# Patient Record
Sex: FEMALE | Race: WHITE | Employment: OTHER | URBAN - METROPOLITAN AREA
[De-identification: names, ages, dates, MRNs, and addresses within clinical notes are randomized per-mention and may not be internally consistent; named-entity substitution may affect disease eponyms.]

---

## 2020-02-12 ENCOUNTER — HOSPITAL ENCOUNTER (OUTPATIENT)
Dept: PHYSICAL THERAPY | Age: 70
Discharge: HOME | End: 2020-02-12
Attending: FAMILY MEDICINE
Payer: MEDICARE

## 2020-02-12 DIAGNOSIS — M79.605 BILATERAL LEG PAIN: ICD-10-CM

## 2020-02-12 DIAGNOSIS — M79.604 BILATERAL LEG PAIN: ICD-10-CM

## 2020-02-12 PROCEDURE — 97162 PT EVAL MOD COMPLEX 30 MIN: CPT

## 2020-02-12 PROCEDURE — 97110 THERAPEUTIC EXERCISES: CPT

## 2020-02-12 NOTE — PROGRESS NOTES
Kim Cherry  : 1950  Payor: Cheryl Feng / Plan: Al Pollock MEDICARE COMPLETE / Product Type: Managed Care Medicare /  [address] [place] [address]  at [place] [address] Therapy  [address], [address]  [phone]   [phone]      OUTPATIENT PHYSICAL THERAPY: Daily Treatment Note 2020  Visit Count:  1    ICD-10: Treatment Diagnosis: pain in right limb M79.604, pain in left limb M79.606, difficulty walking R 26.2    Pre-treatment Symptoms/Complaints:  Pt presenting with increased left leg pain today. Pain: Initial: Pain Intensity 1: 7  Post Session:  7/10   Medications Last Reviewed:  2020  Updated Objective Findings:  See evaluation note from today   TREATMENT:   THERAPEUTIC EXERCISE: (15 minutes):  Exercises per grid below to improve mobility and strength. Required minimal verbal cues to promote proper body mechanics. Progressed resistance, range and repetitions as indicated. LTR x 5  Double knee to chest x 3 hold 10 sec  Bridging x 5  Hamstring stretching 2x hold 20 sec  Manual Therapy (  na    ): na    Therapeutic Modalities ( na    ):na    Evaluation (x)    HEP: As above; handouts given to patient for all exercises. Treatment/Session Summary:    · Response to Treatment:  Pt seen for eval today. She has increased pain in the left leg today and has had difficulty with walking. She will benefit from lower back exercises as it appears she is dealing with sciatica like symptoms and possible disc problems. · Communication/Consultation:  None today  · Equipment provided today:  None today  · Recommendations/Intent for next treatment session: Next visit will focus on strengthening and stretching and modalities as needed.   Treatment Plan of Care Effective Dates:  2020 to 2020  Total Treatment Billable Duration:  eval plus 15 min  PT Patient Time In/Time Out  Time In: 0100  Time Out: Beata May 60, PT    Future Appointments   Date Time Provider Bala Allen   2/17/2020  9:00 AM Sadia End, PT [facility]   2/19/2020 11:30 AM Sadia End, PT [facility]

## 2020-02-12 NOTE — THERAPY EVALUATION
Shane Class Dilip  : 1950  Payor: Tam Simmons / Plan: Nirajjose Schroeder MEDICARE COMPLETE / Product Type: Managed Care Medicare /  [facility] [place]  at [facility]  [address], [address]  [phone]   [phone]         OUTPATIENT PHYSICAL THERAPY:Initial Assessment 2020   ICD-10: Treatment Diagnosis: pain in right limb M79.604, pain in left limb M79.606, difficulty walking R 26.2  Precautions/Allergies:   Patient has no known allergies. TREATMENT PLAN:  Effective Dates: 2020 TO 2020 (90 days). Frequency/Duration: 2 times a week for 90 Day(s) and upon reassessment, will adjust frequency and duration as progress indicates. MEDICAL/REFERRING DIAGNOSIS:  Bilateral leg pain [M79.604, M79.605]   DATE OF ONSET: 2020  REFERRING PHYSICIAN: Cecil Stockton MD MD Orders: Daryle Hare and treat  Return MD Appointment: as needed     INITIAL ASSESSMENT:  Ms. Jessica Molina presents with increased bilateral leg pain with left worse than right today. She reported picking up a box and turning and she began to feel increased right hip/buttocks pain followed by right leg pain. She reported this has improved, but now she has increased pain in the left leg. She attempted yoga today and thinks this made her left leg worse. She did have some treatments by her chiropractor and he also took x-rays. Per his assessment, pt has stenosis. Pt has not seen chiropractor recently and was referred to therapy to address her pain. She was very active prior to her injury, exercising 5 days a week. She enjoys pilates, yoga, cardio and weight training. PROBLEM LIST (Impacting functional limitations):  1. Decreased Strength  2. Decreased ADL/Functional Activities  3. Decreased Ambulation Ability/Technique  4. Increased Pain  5. Decreased Activity Tolerance  6.  Decreased Flexibility/Joint Mobility INTERVENTIONS PLANNED: (Treatment may consist of any combination of the following)  1. Electrical Stimulation  2. Heat  3. Home Exercise Program (HEP)  4. Manual Therapy  5. Range of Motion (ROM)  6. Therapeutic Exercise/Strengthening  7. lumbar mechanical traction     GOALS: (Goals have been discussed and agreed upon with patient.)  Short-Term Functional Goals: Time Frame: 4 weeks  1. Establish independent HEP with no cuing. 2. Pt will be able to report ambulating up to 20 minutes without increased pain. 3. Pt will be able to report ability to return to yoga for 20-30 minutes without increased pain. Discharge Goals: Time Frame: 12 weeks (90 days)  1. Pt will be able to improve score on LEFS by at least 9 points for advanced ADL's.  2. Pt will be able to report ability to bend forward and lift objects without increased pain. 3. Pt will be able to increase left LE strength to 4+/5 to 5/5.  4. Pt will be able to return to prior activity level. OUTCOME MEASURE:   Tool Used: Lower Extremity Functional Scale (LEFS)  Score:  Initial: 35/80 Most Recent: X/80 (Date: -- )   Interpretation of Score: 20 questions each scored on a 5 point scale with 0 representing \"extreme difficulty or unable to perform\" and 4 representing \"no difficulty\". The lower the score, the greater the functional disability. 80/80 represents no disability. Minimal detectable change is 9 points. MEDICAL NECESSITY:   · Patient is expected to demonstrate progress in strength to improve her overall mobility and ability to ambulate normally without increased back of leg pain. REASON FOR SERVICES/OTHER COMMENTS:  · Patient continues to require skilled intervention due to antalgic gait pattern, pain and inability to stand or walk for prolonged periods of time.   Total Duration:  PT Patient Time In/Time Out  Time In: 0100  Time Out: 0200    Rehabilitation Potential For Stated Goals: Good  Regarding Bull Rodriguez Dilip's therapy, I certify that the treatment plan above will be carried out by a therapist or under their direction. Thank you for this referral,  Shawna Aviles, PT     Referring Physician Signature: Lauren Eid MD _______________________________ Date _____________                        PAIN/SUBJECTIVE:   Initial: Pain Intensity 1: 7  Post Session:  7/10   HISTORY:   History of Injury/Illness (Reason for Referral):  Ms. Yanna Shannon presents with increased bilateral leg pain with left worse than right today. She reported picking up a box and turning and she began to feel increased right hip/buttocks pain followed by right leg pain. She reported this has improved, but now she has increased pain in the left leg. She attempted yoga today and thinks this made her left leg worse. She did have some treatments by her chiropractor and he also took x-rays. Per his assessment, pt has stenosis. Pt has not seen chiropractor recently and was referred to therapy to address her pain. She was very active prior to her injury, exercising 5 days a week. She enjoys pilates, yoga, cardio and weight training. Past Medical History/Comorbidities:   Ms. Yanna Shannon  has a past medical history of Anxiety. Ms. Yanna Shannon  has a past surgical history that includes hx heent (1978) and hx orthopaedic (2005). Social History/Living Environment:     pt lives with spouse  Prior Level of Function/Work/Activity:  Pt is retired. Very active. Works out 5 days per week     Ambulatory/Rehab Services H2 Model Falls Risk Assessment   Risk Factors:       No Risk Factors Identified Ability to Rise from Chair:       (0)  Ability to rise in a single movement   Falls Prevention Plan:       No modifications necessary   Total: (5 or greater = High Risk): 0   ©2010 [facility]. All Rights Reserved. [facility] States Patent #[id number].  Federal Law prohibits the replication, distribution or use without written permission from [employer]   Current Medications:       Current Outpatient Medications:     glucosamine sulfate 500 mg capsule, Take 1,000 mg by mouth three (3) times daily. , Disp: , Rfl:     meloxicam (MOBIC) 7.5 mg tablet, Take 1-2 Tabs by mouth daily. , Disp: 30 Tab, Rfl: 1    FLUoxetine (PROZAC) 20 mg capsule, Take 1-2 Caps by mouth daily. , Disp: 180 Cap, Rfl: 3    Magnesium Oxide 500 mg cap, Take  by mouth., Disp: , Rfl:     cyanocobalamin 1,000 mcg tablet, Take 1,000 mcg by mouth daily. , Disp: , Rfl:     multivitamin (ONE A DAY) tablet, Take 1 Tab by mouth daily. , Disp: , Rfl:    Date Last Reviewed:  2/12/2020   Number of Personal Factors/Comorbidities that affect the Plan of Care: 1-2: MODERATE COMPLEXITY        EXAMINATION:     ROM:     LROM WFL-pt did feel some pain with rotation  Extension is definitely painful for the patient-  Mild bilateral hip ER tightness-all other hip motions WFL                              Strength:     R hip flex 5/5, quad 4+/5, HS 5/5, glut  Med 4+/5, ankles 5/5         L hip flex 4/5, quad 4/5, HS 4/5, glut med 4/5, ankles 5/5            Special Tests: negative SLR  Neurological Screen: pt reporting numbness and tingling in bilateral toes  Balance:  good      Body Structures Involved:  1. Nerves  2. Bones  3. Joints  4. Muscles  5. Ligaments Body Functions Affected:  1. Sensory/Pain  2. Neuromusculoskeletal  3. Movement Related Activities and Participation Affected:  1. General Tasks and Demands  2. Mobility  3. Domestic Life  4. Interpersonal Interactions and Relationships  5.  Community, Social and [place]   Number of elements (examined above) that affect the Plan of Care: 4+: HIGH COMPLEXITY   CLINICAL PRESENTATION:   Presentation: Evolving clinical presentation with changing clinical characteristics: MODERATE COMPLEXITY   CLINICAL DECISION MAKING:   Use of outcome tool(s) and clinical judgement create a POC that gives a: Questionable prediction of patient's progress: MODERATE COMPLEXITY

## 2020-02-17 ENCOUNTER — HOSPITAL ENCOUNTER (OUTPATIENT)
Dept: PHYSICAL THERAPY | Age: 70
Discharge: HOME | End: 2020-02-17
Attending: FAMILY MEDICINE
Payer: MEDICARE

## 2020-02-17 PROCEDURE — 97012 MECHANICAL TRACTION THERAPY: CPT

## 2020-02-17 PROCEDURE — 97110 THERAPEUTIC EXERCISES: CPT

## 2020-02-17 PROCEDURE — 97140 MANUAL THERAPY 1/> REGIONS: CPT

## 2020-02-17 NOTE — PROGRESS NOTES
Vazquez Cherry  : 1950  Payor: Carson Asher / Plan: Sandi Thompson / Product Type: Managed Care Medicare /  [address] [place]  at [facility] Therapy  [address], [address]  [phone]   [phone]      OUTPATIENT PHYSICAL THERAPY: Daily Treatment Note 2020  Visit Count:  2    ICD-10: Treatment Diagnosis: pain in right limb M79.604, pain in left limb M79.606, difficulty walking R 26.2    Pre-treatment Symptoms/Complaints:  Pt presenting with continued increased left leg pain in the mid hamstring region. The pain radiates down the leg and she does report some numbness at times. Pain: Initial: Pain Intensity 1: 7  Post Session:  7/10   Medications Last Reviewed:  2020  Updated Objective Findings:  None Today   TREATMENT:   THERAPEUTIC EXERCISE: (15 minutes):  Exercises per grid below to improve mobility and strength. Required minimal verbal cues to promote proper body mechanics. Progressed resistance, range and repetitions as indicated. Review of HEP  Neural gliding in anterior pelvic tilt x 12  Neural gliding in posterior pelvic tilt x 12  Manual Therapy (  15 min   ): STM to left posterior hamstring and left buttocks to release tension    Therapeutic Modalities ( 15 min   ): traction x 15 min at 70# for reduction in radicular symptoms    HEP: continue as directed    Treatment/Session Summary:    · Response to Treatment:  Pt reporting continued increased left leg pain in the posterior thigh along mid hamstrings. She felt less pain with posterior pelvic tilt and neural gliding. She did also report a reduction in her leg pain after traction, but it was not fully resolved. .  · Communication/Consultation:  None today  · Equipment provided today:  None today  · Recommendations/Intent for next treatment session: Next visit will focus on strengthening and stretching and modalities as needed.   Treatment Plan of Care Effective Dates:  2/12/2020 to 5/12/2020  Total Treatment Billable Duration:  45  PT Patient Time In/Time Out  Time In: 0900  Time Out: 311 [facility], PT    Future Appointments   Date Time Provider Bala Allen   2/19/2020 11:30 AM Fide Martinez PT [facility]

## 2020-02-19 ENCOUNTER — HOSPITAL ENCOUNTER (OUTPATIENT)
Dept: PHYSICAL THERAPY | Age: 70
Discharge: HOME | End: 2020-02-19
Attending: FAMILY MEDICINE
Payer: MEDICARE

## 2020-02-19 PROCEDURE — 97012 MECHANICAL TRACTION THERAPY: CPT

## 2020-02-19 NOTE — PROGRESS NOTES
Jah Cherry  : 1950  Payor: Yonathan Civil / Plan: [employer] / Product Type: Managed Care Medicare /  [address]  at [facility] Therapy  [address], [address]  [phone]   [phone]      OUTPATIENT PHYSICAL THERAPY: Daily Treatment Note 2020  Visit Count:  3    ICD-10: Treatment Diagnosis: pain in right limb M79.604, pain in left limb M79.606, difficulty walking R 26.2    Pre-treatment Symptoms/Complaints:  Pt presenting with continued increased left leg pain in the mid hamstring region. The pain radiates down the leg and she does report some numbness at times. She does  Pain: Initial: Pain Intensity 1: 7  Post Session:  6/10   Medications Last Reviewed:  2020  Updated Objective Findings:  None Today   TREATMENT:   THERAPEUTIC EXERCISE: (0 minutes):  Exercises per grid below to improve mobility and strength. Required minimal verbal cues to promote proper body mechanics. Progressed resistance, range and repetitions as indicated. Review of HEP x 5 min  Neural gliding in anterior pelvic tilt x 12  Neural gliding in posterior pelvic tilt x 12  Manual Therapy (  0 min   ): na    Therapeutic Modalities ( 25 min   ): traction x 25 min at 70# for reduction in radicular symptoms    HEP: continue as directed    Treatment/Session Summary:    · Response to Treatment:  Pt reporting she has had ongoing left leg pain. She did feel relief while in the traction unit. She also reported some mild relief post last treatment. She is using over the counter lidocane patches now as needed. Will continue to work on reducing her leg pain with standing by use of traction and possible exercises. .  · Communication/Consultation:  None today  · Equipment provided today:  None today  · Recommendations/Intent for next treatment session: Next visit will focus on strengthening and stretching and modalities as needed.   Treatment Plan of Care Effective Dates:  2/12/2020 to 5/12/2020  Total Treatment Billable Duration:  25 min  PT Patient Time In/Time Out  Time In: 1130  Time Out: [address], PT    Future Appointments   Date Time Provider Bala Allen   2/25/2020  9:45 AM Sadia End, PT [facility]   2/28/2020  9:45 AM Sadia End, PT [facility]

## 2020-02-25 ENCOUNTER — HOSPITAL ENCOUNTER (OUTPATIENT)
Dept: PHYSICAL THERAPY | Age: 70
Discharge: HOME | End: 2020-02-25
Attending: FAMILY MEDICINE
Payer: MEDICARE

## 2020-02-25 PROCEDURE — 97110 THERAPEUTIC EXERCISES: CPT

## 2020-02-25 PROCEDURE — 97140 MANUAL THERAPY 1/> REGIONS: CPT

## 2020-02-25 PROCEDURE — 97012 MECHANICAL TRACTION THERAPY: CPT

## 2020-02-25 NOTE — PROGRESS NOTES
Candice Cherry  : 1950  Payor: Teja Jacobs / Plan: [address] / Product Type: Managed Care Medicare /  [address] [place]  at [facility] Therapy  [address], [address]  [phone]   [phone]      OUTPATIENT PHYSICAL THERAPY: Daily Treatment Note 2020  Visit Count:  4    ICD-10: Treatment Diagnosis: pain in right limb M79.604, pain in left limb M79.606, difficulty walking R 26.2    Pre-treatment Symptoms/Complaints:  Pt presenting with continued pain in the left leg along buttocks and hamstrings. She reports spasming there. She will be seeing MD at [address] [place] [address] today. Pain: Initial: Pain Intensity 1: 7  Post Session:  6/10   Medications Last Reviewed:  2020  Updated Objective Findings:  None Today   TREATMENT:   THERAPEUTIC EXERCISE: (19 minutes):  Exercises per grid below to improve mobility and strength. Required minimal verbal cues to promote proper body mechanics. Progressed resistance, range and repetitions as indicated. Nu-step level 2 x 10 min  LTR x 20  Pelvic tilt x 20    Manual Therapy (  10 min   ): STM to the region of the left buttocks, greater trochanter and hamstring to help reduce spasm and pain    Therapeutic Modalities ( 20 min   ): traction x 25 min at 70# for reduction in radicular symptoms  That pt is having down her left leg  HEP: continue as directed    Treatment/Session Summary:    · Response to Treatment:  Pt reports she had spams down the left buttocks and down the left leg. She did perform several light exercises today and had mild relief. On traction, pt was reporting a reduction in her radicular symptoms. She will be seeing MD today at [place]. She also had some palpable edema around the left greater trochanter region.   · Communication/Consultation:  None today  · Equipment provided today:  None today  · Recommendations/Intent for next treatment session: Next visit will focus on strengthening and stretching and modalities as needed.   Treatment Plan of Care Effective Dates:  2/12/2020 to 5/12/2020  Total Treatment Billable Duration:  49 min  PT Patient Time In/Time Out  Time In: 0945  Time Out: Jennifer Nava 55, PT    Future Appointments   Date Time Provider Bala Allen   2/28/2020  9:45 AM Sara Crisostomo, PT [facility]

## 2020-02-28 ENCOUNTER — HOSPITAL ENCOUNTER (OUTPATIENT)
Dept: PHYSICAL THERAPY | Age: 70
Discharge: HOME | End: 2020-02-28
Attending: FAMILY MEDICINE
Payer: MEDICARE

## 2020-02-28 PROCEDURE — 97110 THERAPEUTIC EXERCISES: CPT

## 2020-02-28 PROCEDURE — 97012 MECHANICAL TRACTION THERAPY: CPT

## 2020-02-28 NOTE — PROGRESS NOTES
Latia Cherry  : 1950  Payor: Lisa Miranda / Plan: Carlos Lancaster MEDICARE COMPLETE / Product Type: Managed Care Medicare /  [facility] [place]  at [place] [address] Therapy  [address], [address]  [phone]   [phone]      OUTPATIENT PHYSICAL THERAPY: Daily Treatment Note 2020  Visit Count:  5    ICD-10: Treatment Diagnosis: pain in right limb M79.604, pain in left limb M79.606, difficulty walking R 26.2    Pre-treatment Symptoms/Complaints:  Pt presenting she saw PA at [facility]. She has been diagnosed with DDD, spondylolisthesis, and scoliosis. Pain: Initial: Pain Intensity 1: 7  Post Session:  6/10   Medications Last Reviewed:  2020  Updated Objective Findings:  None Today   TREATMENT:   THERAPEUTIC EXERCISE: (38 minutes):  Exercises per grid below to improve mobility and strength. Required minimal verbal cues to promote proper body mechanics. Progressed resistance, range and repetitions as indicated. Nu-step level 2 x 10 min  LTR with ball x 20  Pelvic tilt x 20  abd bracing x 20  Bilateral hip extension 37.5# x 30  Attempted prone on elbows, but increased radicular symptoms  Manual Therapy (  0min   ): na  Therapeutic Modalities ( 20 min   ): traction x 20 min at 70# for reduction in radicular symptoms  That pt is having down her left leg  HEP: continue as directed    Treatment/Session Summary:    · Response to Treatment:  Pt reports feeling ongoing leg pain in the leg. We progressed to several lower back core exercises today, but very minimal due to pain. She is also receiving traction to help with her radicular symptoms. She will attempt to ride recumbent bike at gym to help with her cardio. .  · Communication/Consultation:  None today  · Equipment provided today:  None today  · Recommendations/Intent for next treatment session: Next visit will focus on strengthening and stretching and modalities as needed.   Treatment Plan of Care Effective Dates:  2/12/2020 to 5/12/2020  Total Treatment Billable Duration:  58 min  PT Patient Time In/Time Out  Time In: 0945  Time Out: Concepcion Mcdermott 19, PT    No future appointments.

## 2020-03-02 ENCOUNTER — HOSPITAL ENCOUNTER (OUTPATIENT)
Dept: PHYSICAL THERAPY | Age: 70
Discharge: HOME | End: 2020-03-02
Attending: FAMILY MEDICINE
Payer: MEDICARE

## 2020-03-02 PROCEDURE — 97110 THERAPEUTIC EXERCISES: CPT

## 2020-03-02 PROCEDURE — 97012 MECHANICAL TRACTION THERAPY: CPT

## 2020-03-02 NOTE — PROGRESS NOTES
Percell Class Domarily  : 1950  Payor: Tam Simmons / Plan: Mirlande Shannon / Product Type: Managed Care Medicare /  [facility] [place] [facility] [place]  at [facility] Therapy  [address], [address]  [phone]   [phone]      OUTPATIENT PHYSICAL THERAPY: Daily Treatment Note 3/2/2020  Visit Count:  6    ICD-10: Treatment Diagnosis: pain in right limb M79.604, pain in left limb M79.606, difficulty walking R 26.2    Pre-treatment Symptoms/Complaints:  Pt presenting she felt great after her last treatment, but then she went grocery shopping and leg pain returned. Pain: Initial: Pain Intensity 1: 7  Post Session:  6/10   Medications Last Reviewed:  3/2/2020  Updated Objective Findings:  None Today   TREATMENT:   THERAPEUTIC EXERCISE: (38 minutes):  Exercises per grid below to improve mobility and strength. Required minimal verbal cues to promote proper body mechanics. Progressed resistance, range and repetitions as indicated. Nu-step level 2 x 10 min  LTR with ball x 20  Pelvic tilt x 20  abd bracing x 20  abd bracing with bent knee fall outs x 20  Bilateral HS stretching 3x hold 20 sec  Left supine piriformis stretching 3x hold 25 sec  Bilateral hip extension 37.5# x 30    Manual Therapy (  0min   ): na  Therapeutic Modalities ( 20 min   ): traction x 20 min at 70# for reduction in radicular symptoms  That pt is having down her left leg  HEP: continue as directed    Treatment/Session Summary:    · Response to Treatment:  Pt reporting feeling better post treatment. She reports the therapy helps to temporrary relieve her leg pain. She is having radicular symptoms from the DDD in her lower spine. She is progressing well, but is very used to an active lifestyle and is having to learn to cope with lighter activities at this time until her symptoms improve. .  · Communication/Consultation:  None today  · Equipment provided today:  None today  · Recommendations/Intent for next treatment session: Next visit will focus on strengthening and stretching and modalities as needed.   Treatment Plan of Care Effective Dates:  2/12/2020 to 5/12/2020  Total Treatment Billable Duration:  58 min  PT Patient Time In/Time Out  Time In: 0900  Time Out: [address], PT    Future Appointments   Date Time Provider Bala Allen   3/4/2020  9:45 AM Mirlande Hinton, PT [facility]

## 2020-03-04 ENCOUNTER — HOSPITAL ENCOUNTER (OUTPATIENT)
Dept: PHYSICAL THERAPY | Age: 70
Discharge: HOME | End: 2020-03-04
Attending: FAMILY MEDICINE
Payer: MEDICARE

## 2020-03-04 PROCEDURE — 97110 THERAPEUTIC EXERCISES: CPT

## 2020-03-04 PROCEDURE — 97012 MECHANICAL TRACTION THERAPY: CPT

## 2020-03-04 NOTE — PROGRESS NOTES
Cheryl Cherry  : 1950  Payor: Julius Rossi / Plan: Remi Portillo MEDICARE COMPLETE / Product Type: Managed Care Medicare /  [address] [place]  at [facility] Therapy  [address] [place] [address], [address]  [phone]   [phone]      OUTPATIENT PHYSICAL THERAPY: Daily Treatment Note 3/4/2020  Visit Count:  7    ICD-10: Treatment Diagnosis: pain in right limb M79.604, pain in left limb M79.606, difficulty walking R 26.2    Pre-treatment Symptoms/Complaints:  Pt presenting she continues to have increase left hamstring pain/leg pain. Pain: Initial: Pain Intensity 1: 7  Post Session:  6/10   Medications Last Reviewed:  3/4/2020  Updated Objective Findings:  None Today   TREATMENT:   THERAPEUTIC EXERCISE: (38 minutes):  Exercises per grid below to improve mobility and strength. Required minimal verbal cues to promote proper body mechanics. Progressed resistance, range and repetitions as indicated. Nu-step level 2 x 10 min  LTR with ball x 20  Pelvic tilt x 20  abd bracing x 20  abd bracing with bent knee fall outs x 20  Bilateral HS stretching 3x hold 20 sec  Left supine piriformis stretching 3x hold 25 sec  Bilateral hip extension 37.5# x 30  Bird dog x 15  HS curls 20# double leg x 20  Manual Therapy (  0min   ): na  Therapeutic Modalities ( 20 min   ): traction x 20 min at 78 # for reduction in radicular symptoms  That pt is having down her left leg  HEP: continue as directed    Treatment/Session Summary:    · Response to Treatment:  Pt continues to have radicular symptoms down the leg. She is using massager on the leg at home and was instructed in how many times a day to do this. Three to four times at 10 minutes at a time. She has a tendency to overdo and this could be contributing to her ongoing symptoms. She progressed well with exercises today in clinic. Traction seems to relieve her leg pain. .  · Communication/Consultation:  None today  · Equipment provided today:  None today  · Recommendations/Intent for next treatment session: Next visit will focus on strengthening and stretching and modalities as needed. Treatment Plan of Care Effective Dates:  2/12/2020 to 5/12/2020  Total Treatment Billable Duration:  58 min  PT Patient Time In/Time Out  Time In: 0945  Time Out: Concepcion Mcdermott 19, PT    No future appointments.

## 2020-03-11 ENCOUNTER — HOSPITAL ENCOUNTER (OUTPATIENT)
Dept: PHYSICAL THERAPY | Age: 70
Discharge: HOME | End: 2020-03-11
Attending: FAMILY MEDICINE
Payer: MEDICARE

## 2020-03-11 PROCEDURE — 97012 MECHANICAL TRACTION THERAPY: CPT

## 2020-03-11 PROCEDURE — 97110 THERAPEUTIC EXERCISES: CPT

## 2020-03-11 NOTE — PROGRESS NOTES
Paul Cherry  : 1950  Payor: [address] [place] [address] / Plan: Diana Patel MEDICARE COMPLETE / Product Type: Managed Care Medicare /  [facility] [place]  at [facility] Therapy  [address] [place] [address], [address]  [phone]   [phone]      OUTPATIENT PHYSICAL THERAPY: Daily Treatment Note 3/11/2020  Visit Count:  8    ICD-10: Treatment Diagnosis: pain in right limb M79.604, pain in left limb M79.606, difficulty walking R 26.2    Pre-treatment Symptoms/Complaints:  Pt reporting continued pain in the left leg. Foot is now going numb. She has CT scan for 3/17/2020. Pain: Initial: Pain Intensity 1: 7  Post Session:  6/10   Medications Last Reviewed:  3/11/2020  Updated Objective Findings:  None Today   TREATMENT:   THERAPEUTIC EXERCISE: (38 minutes):  Exercises per grid below to improve mobility and strength. Required minimal verbal cues to promote proper body mechanics. Progressed resistance, range and repetitions as indicated. Nu-step level 2 x 10 min  LTR with ball x 20  Pelvic tilt x 20  abd bracing x 20  abd bracing with bent knee fall outs x 20  Bilateral HS stretching 3x hold 20 sec  Left supine piriformis stretching 3x hold 25 sec  Bilateral hip extension 37.5# x 30-held  Bird dog x 15-held  HS curls 20# double leg x 20-held  Manual Therapy (  0min   ): na  Therapeutic Modalities ( 25 min   ): traction x 25 min at 78 # for reduction in radicular symptoms  That pt is having down her left leg  HEP: continue as directed    Treatment/Session Summary:    · Response to Treatment:  Pt reports feeling continued pain and spasms in her left leg in hamstring region and now foot is going numb. Attempted hamstring stretching, but this made her worse. We performed as many exercises as patient could tolerate and performed traction. this does help to alleviate some of her leg pain. .  · Communication/Consultation:  None today  · Equipment provided today:  None today  · Recommendations/Intent for next treatment session: Next visit will focus on strengthening and stretching and modalities as needed.   Treatment Plan of Care Effective Dates:  2/12/2020 to 5/12/2020  Total Treatment Billable Duration:  63 min  PT Patient Time In/Time Out  Time In: 0115  Time Out: [address], PT    Future Appointments   Date Time Provider Bala Allen   3/13/2020 12:45 PM Sadia End, PT [facility]   3/16/2020 12:15 PM Sadia End, PT [facility]   3/17/2020 10:00 AM [facility] IR UNIT 2 [facility]   3/17/2020 16:27 AM SFD CT 64 SLICE UNIT 1 [facility]   3/19/2020  1:00 PM Sadia End, PT [facility]   3/24/2020 12:15 PM Sadia End, PT [facility]   3/26/2020  1:00 PM Sadia End, PT [facility]

## 2020-03-13 ENCOUNTER — APPOINTMENT (OUTPATIENT)
Dept: PHYSICAL THERAPY | Age: 70
End: 2020-03-13
Attending: FAMILY MEDICINE
Payer: MEDICARE

## 2020-03-16 ENCOUNTER — HOSPITAL ENCOUNTER (OUTPATIENT)
Dept: PHYSICAL THERAPY | Age: 70
Discharge: HOME | End: 2020-03-16
Attending: FAMILY MEDICINE
Payer: MEDICARE

## 2020-03-16 PROCEDURE — 97110 THERAPEUTIC EXERCISES: CPT

## 2020-03-16 PROCEDURE — 97012 MECHANICAL TRACTION THERAPY: CPT

## 2020-03-16 NOTE — PROGRESS NOTES
Percell Class Doohen  : 1950  Payor: Tam Simmons / Plan: Anca Malonemery MEDICARE COMPLETE / Product Type: Managed Care Medicare /  [address]  at [facility] Therapy  [address], [address]  [phone]   [phone]      OUTPATIENT PHYSICAL THERAPY: Daily Treatment Note 3/16/2020  Visit Count:  9    ICD-10: Treatment Diagnosis: pain in right limb M79.604, pain in left limb M79.606, difficulty walking R 26.2    Pre-treatment Symptoms/Complaints:  Patient reports no real change today. She states she still hurts as much as she did last treatment. Pain: Initial: Pain Intensity 1: 7  Post Session:  6/10   Medications Last Reviewed:  3/16/2020  Updated Objective Findings:  None Today   TREATMENT:   THERAPEUTIC EXERCISE: (40 minutes):  Exercises per grid below to improve mobility and strength. Required minimal verbal cues to promote proper body mechanics. Progressed resistance, range and repetitions as indicated. Nu-step level 2 x 10 min  LTR with ball x 20  Pelvic tilt x 20  abd bracing x 20  abd bracing with bent knee fall outs x 20  Bilateral HS stretching 3x hold 20 sec  Left supine piriformis stretching 3x hold 25 sec  Bilateral hip extension 37.5# x 30-held  Bird dog x 15-held  HS curls 20# double leg x 20-held  Manual Therapy (  0min   ): na  Therapeutic Modalities ( 20 min   ): traction x 25 min at 78 # for reduction in radicular symptoms  That pt is having down her left leg  HEP: continue as directed    Treatment/Session Summary:    · Response to Treatment:  Pt reports feeling continued pain and spasms in her left leg in hamstring region and now foot is going numb. Attempted hamstring stretching, and heel cord stretching which seemed to help slightly today. We performed as many exercises as patient could tolerate and performed traction. Patient hopes that after her scan tomorrow she will have a better direction.  Patient would like to return to some of her prior activities. · Communication/Consultation:  None today  · Equipment provided today:  None today  · Recommendations/Intent for next treatment session: Next visit will focus on strengthening and stretching and modalities as needed.   Treatment Plan of Care Effective Dates:  2/12/2020 to 5/12/2020  Total Treatment Billable Duration:  60 min  PT Patient Time In/Time Out  Time In: 0100  Time Out: [address] [facility]    Future Appointments   Date Time Provider Bala Allen   3/17/2020 10:00 AM [facility] IR UNIT 2 [facility]   3/17/2020 31:52 AM SFD CT 64 SLICE UNIT 1 SFDRCT [facility]   3/19/2020  1:00 PM Doc Even, PT [facility]   3/24/2020 12:15 PM Doc Even, PT [facility]   3/26/2020  1:00 PM Doc Even, PT [facility]

## 2020-03-17 ENCOUNTER — HOSPITAL ENCOUNTER (OUTPATIENT)
Dept: CT IMAGING | Age: 70
Discharge: HOME | End: 2020-03-17
Attending: PHYSICIAN ASSISTANT
Payer: MEDICARE

## 2020-03-17 ENCOUNTER — HOSPITAL ENCOUNTER (OUTPATIENT)
Dept: INTERVENTIONAL RADIOLOGY/VASCULAR | Age: 70
Discharge: HOME | End: 2020-03-17
Attending: PHYSICIAN ASSISTANT
Payer: MEDICARE

## 2020-03-17 VITALS
OXYGEN SATURATION: 96 % | SYSTOLIC BLOOD PRESSURE: 140 MMHG | WEIGHT: 130 LBS | HEIGHT: 63 IN | TEMPERATURE: 98 F | HEART RATE: 86 BPM | DIASTOLIC BLOOD PRESSURE: 72 MMHG | BODY MASS INDEX: 23.04 KG/M2 | RESPIRATION RATE: 16 BRPM

## 2020-03-17 DIAGNOSIS — M51.36 DDD (DEGENERATIVE DISC DISEASE), LUMBAR: ICD-10-CM

## 2020-03-17 PROCEDURE — 62304 MYELOGRAPHY LUMBAR INJECTION: CPT

## 2020-03-17 PROCEDURE — 72132 CT LUMBAR SPINE W/DYE: CPT

## 2020-03-17 PROCEDURE — 74011000250 HC RX REV CODE- 250: Performed by: RADIOLOGY

## 2020-03-17 PROCEDURE — 74011636320 HC RX REV CODE- 636/320: Performed by: PHYSICIAN ASSISTANT

## 2020-03-17 PROCEDURE — 77030014143 HC TY PUNC LUMBR BD -A

## 2020-03-17 PROCEDURE — 77030003666 HC NDL SPINAL BD -A

## 2020-03-17 RX ORDER — LIDOCAINE HYDROCHLORIDE 20 MG/ML
1-20 INJECTION, SOLUTION INFILTRATION; PERINEURAL ONCE
Status: COMPLETED | OUTPATIENT
Start: 2020-03-17 | End: 2020-03-17

## 2020-03-17 RX ADMIN — IOPAMIDOL 13 ML: 408 INJECTION, SOLUTION INTRATHECAL at 10:45

## 2020-03-17 RX ADMIN — LIDOCAINE HYDROCHLORIDE 3 ML: 20 INJECTION, SOLUTION INFILTRATION; PERINEURAL at 10:43

## 2020-03-17 NOTE — DISCHARGE INSTRUCTIONS
[address]  Department of Interventional Radiology  [facility]  [phone] Office  [phone] Fax  POST LUMBAR PUNCTURE/MYELOGRAM/INTRATHECAL CHEMOTHERAPY DISCHARGE INSTRUCTIONS  General Information:  Lumbar Puncture: A LP is done to help diagnose several disorders, like pseudo tumor, migraines, meningitis, and multiple sclerosis. It involves a puncture (usually in the lower spine) into the sac that protects the spinal column. A sample of the fluid in that space is removed and tested in the lab. Myelogram:   A Myelogram involves a lumbar puncture, and instead of removing fluid, contrast will be injected into the sac surrounding the spinal column. It is done to visualize the spinal column, nerve roots, spinal canal, vertebral discs and disc space. It is usually done to diagnose back pain with unknown cause or in preparation for surgery. After the injection, a CT scan will be done, usually within two hours of the injection. Intrathecal Chemotherapy:   Chemotherapy can be given in many forms. Intrathecal chemo involves a lumbar puncture, and instead of removing fluid, the chemo will be injected into the space. After any of these procedures, you will be asked to lie flat on your back for 4-6 hours to prevent complications. You should also rest for 24 hours after you go home, and force fluids. If you have a headache, you should take Tylenol or acetaminophen. Call If:   You should call your Physician and/or the Radiology Nurse if you develop a headache that is not relieved by Tylenol, and worsens when you stand and eases when you lie down, you need to call. You may have developed what is referred to as a spinal headache. Our physicians will probably advise you to be on strict bed rest for 24 hours, to drink lots of fluids and caffeine. If this does not help the head pain, call again the next day.  You should call if you have bleeding other than a small spot on your bandage. You should call if you have any numbness, tingling, weakness, fever, chills, urinary retention, severe itching, rash, welts, swelling, or confusion. Follow-up Instructions: See the doctor who ordered your procedure as he/she has instructed. If you had a Lumbar Puncture or Myelogram, your results should be available to your ordering doctor in 3-5 business days. You can remove your dressing in 24 hours and shower regularly. Do not bathe or swim for 72 hours. To Reach Us: If you have any questions about your procedure, please call the Interventional Radiology department at [phone]. After business hours (5pm) and weekends, call the answering service at [phone] and ask for the Radiologist on call to be paged. Interventional Radiology General Nurse Discharge    After general anesthesia or intravenous sedation, for 24 hours or while taking prescription Narcotics:  · Limit your activities  · Do not drive and operate hazardous machinery  · Do not make important personal or business decisions  · Do  not drink alcoholic beverages  · If you have not urinated within 8 hours after discharge, please contact your surgeon on call. * Please give a list of your current medications to your Primary Care Provider. * Please update this list whenever your medications are discontinued, doses are     changed, or new medications (including over-the-counter products) are added. * Please carry medication information at all times in case of emergency situations. These are general instructions for a healthy lifestyle:    No smoking/ No tobacco products/ Avoid exposure to second hand smoke  Surgeon General's Warning:  Quitting smoking now greatly reduces serious risk to your health.     Obesity, smoking, and sedentary lifestyle greatly increases your risk for illness  A healthy diet, regular physical exercise & weight monitoring are important for maintaining a healthy lifestyle    You may be retaining fluid if you have a history of heart failure or if you experience any of the following symptoms:  Weight gain of 3 pounds or more overnight or 5 pounds in a week, increased swelling in our hands or feet or shortness of breath while lying flat in bed. Please call your doctor as soon as you notice any of these symptoms; do not wait until your next office visit. Recognize signs and symptoms of STROKE:  F-face looks uneven    A-arms unable to move or move unevenly    S-speech slurred or non-existent    T-time-call 911 as soon as signs and symptoms begin-DO NOT go       Back to bed or wait to see if you get better-TIME IS BRAIN.       Patient Signature:  Date: 3/17/2020  Discharging Nurse: Tomy Keith

## 2020-03-19 ENCOUNTER — APPOINTMENT (OUTPATIENT)
Dept: PHYSICAL THERAPY | Age: 70
End: 2020-03-19
Attending: FAMILY MEDICINE
Payer: MEDICARE

## 2020-03-24 ENCOUNTER — APPOINTMENT (OUTPATIENT)
Dept: PHYSICAL THERAPY | Age: 70
End: 2020-03-24
Attending: FAMILY MEDICINE
Payer: MEDICARE

## 2020-03-26 ENCOUNTER — APPOINTMENT (OUTPATIENT)
Dept: PHYSICAL THERAPY | Age: 70
End: 2020-03-26
Attending: FAMILY MEDICINE
Payer: MEDICARE

## 2020-08-18 NOTE — THERAPY DISCHARGE
Tirso Dozier : 1950 Payor: Xin Horne / Plan: Mary Wong MEDICARE COMPLETE / Product Type: Managed Care Medicare /  [address] [place] [address]  at [place] [address] Therapy 
[address], [address] [place] [address], [address] [phone]   [phone] OUTPATIENT PHYSICAL THERAPY:Discontinuation Summary 2020 ICD-10: Treatment Diagnosis: pain in right limb M79.604, pain in left limb M79.606, difficulty walking R 26.2 Precautions/Allergies:  
Patient has no known allergies. TREATMENT PLAN: 
Effective Dates: 2020 TO 2020 (90 days). Frequency/Duration: 2 times a week for 90 Day(s) and upon reassessment, will adjust frequency and duration as progress indicates. MEDICAL/REFERRING DIAGNOSIS: 
Pain in left leg [M79.605] Pain in left leg [M79.605] DATE OF ONSET: 2020 REFERRING PHYSICIAN: Nicole Jameson MD MD Orders: Thomas Friends and treat Return MD Appointment: as needed INITIAL ASSESSMENT:  Ms. Bret Saavedra presents with increased bilateral leg pain with left worse than right today. She reported picking up a box and turning and she began to feel increased right hip/buttocks pain followed by right leg pain. She reported this has improved, but now she has increased pain in the left leg. She attempted yoga today and thinks this made her left leg worse. She did have some treatments by her chiropractor and he also took x-rays. Per his assessment, pt has stenosis. Pt has not seen chiropractor recently and was referred to therapy to address her pain. She was very active prior to her injury, exercising 5 days a week. She enjoys pilates, yoga, cardio and weight training. Discontinuation Summary 2020:  Tirso Dozier was  seen in physical therapy from 2020 to 3/16/2020 for 9 visits. Treatment has been discontinued at this time due to Covid shutdown. The below goals were met prior to discontinuation.    Some goals were not met due to ongoing pain and spasms. Pt was receiving care for lower back with radicular symptoms. She reported feeling slightly better post traction, but was still having significant pain and spasms in hamstring. She was also limited in her ability to run community errands, perform housework and return to gym activities. Upon chart review, pt was seeking appointment with Dr. Ashley Garcia for follow-ups. Thank you for this referral.    
  
PROBLEM LIST (Impacting functional limitations): 1. Decreased Strength 2. Decreased ADL/Functional Activities 3. Decreased Ambulation Ability/Technique 4. Increased Pain 5. Decreased Activity Tolerance 6. Decreased Flexibility/Joint Mobility INTERVENTIONS PLANNED: (Treatment may consist of any combination of the following) 1. Electrical Stimulation 2. Heat 3. Home Exercise Program (HEP) 4. Manual Therapy 5. Range of Motion (ROM) 6. Therapeutic Exercise/Strengthening 7. lumbar mechanical traction GOALS: (Goals have been discussed and agreed upon with patient.) Short-Term Functional Goals: Time Frame: 4 weeks 1. Establish independent HEP with no cuing.-met 2. Pt will be able to report ambulating up to 20 minutes without increased pain.-in progress 3. Pt will be able to report ability to return to yoga for 20-30 minutes without increased pain. - in progress Discharge Goals: Time Frame: 12 weeks (90 days)- All goals below not met 1. Pt will be able to improve score on LEFS by at least 9 points for advanced ADL's. 
2. Pt will be able to report ability to bend forward and lift objects without increased pain. 3. Pt will be able to increase left LE strength to 4+/5 to 5/5. 
4. Pt will be able to return to prior activity level. OUTCOME MEASURE:  
Tool Used: Lower Extremity Functional Scale (LEFS) Score:  Initial: 35/80 Most Recent: X/80 (Date: -- ) Interpretation of Score: 20 questions each scored on a 5 point scale with 0 representing \"extreme difficulty or unable to perform\" and 4 representing \"no difficulty\". The lower the score, the greater the functional disability. 80/80 represents no disability. Minimal detectable change is 9 points. MEDICAL NECESSITY:  
· Patient is expected to demonstrate progress in strength to improve her overall mobility and ability to ambulate normally without increased back of leg pain. REASON FOR SERVICES/OTHER COMMENTS: 
· Patient continues to require skilled intervention due to antalgic gait pattern, pain and inability to stand or walk for prolonged periods of time. Total Duration: PT Patient Time In/Time Out Time In: 1130 Time Out: 2906 Rehabilitation Potential For Stated Goals: Good [facility], I certify that the treatment plan above will be carried out by a therapist or under their direction. Thank you for this referral, 
Ana Schulz PT    
  
 
  
   
  
   
  
 
PAIN/SUBJECTIVE:  
Initial: Pain Intensity 1: 7  Post Session:  7/10 HISTORY:  
History of Injury/Illness (Reason for Referral): Ms. Raisa Melo presents with increased bilateral leg pain with left worse than right today. She reported picking up a box and turning and she began to feel increased right hip/buttocks pain followed by right leg pain. She reported this has improved, but now she has increased pain in the left leg. She attempted yoga today and thinks this made her left leg worse. She did have some treatments by her chiropractor and he also took x-rays. Per his assessment, pt has stenosis. Pt has not seen chiropractor recently and was referred to therapy to address her pain. She was very active prior to her injury, exercising 5 days a week. She enjoys pilates, yoga, cardio and weight training. Past Medical History/Comorbidities: Ms. Raisa Melo  has a past medical history of Anxiety.   Ms. Raisa Melo  has a past surgical history that includes hx heent (1978) and hx orthopaedic (2005). Social History/Living Environment:  
  pt lives with spouse Prior Level of Function/Work/Activity: 
Pt is retired. Very active. Works out 5 days per week Ambulatory/Rehab Services H2 Model Falls Risk Assessment Risk Factors: 
     No Risk Factors Identified Ability to Rise from Chair: 
     (0)  Ability to rise in a single movement Falls Prevention Plan: No modifications necessary Total: (5 or greater = High Risk): 0  
©2010 [facility] [address] [place] [address] Patent #[id number]. Federal Law prohibits the replication, distribution or use without written permission from [facility] Repair Report Current Medications:   
  
Current Outpatient Medications:  
  methocarbamol (ROBAXIN) 500 mg tablet, Take 1 Tab by mouth three (3) times daily as needed for Muscle Spasm(s). , Disp: 30 Tab, Rfl: 0 
  glucosamine sulfate 500 mg capsule, Take 1,000 mg by mouth three (3) times daily. , Disp: , Rfl:  
  meloxicam (MOBIC) 7.5 mg tablet, Take 1-2 Tabs by mouth daily. , Disp: 30 Tab, Rfl: 1 
  FLUoxetine (PROZAC) 20 mg capsule, Take 1-2 Caps by mouth daily. , Disp: 180 Cap, Rfl: 3 
  Magnesium Oxide 500 mg cap, Take  by mouth., Disp: , Rfl:  
  cyanocobalamin 1,000 mcg tablet, Take 1,000 mcg by mouth daily. , Disp: , Rfl:  
  multivitamin (ONE A DAY) tablet, Take 1 Tab by mouth daily. , Disp: , Rfl:   
Date Last Reviewed:  2/19/2020 Number of Personal Factors/Comorbidities that affect the Plan of Care: 1-2: MODERATE COMPLEXITY EXAMINATION:  
 
ROM:  
  LROM WFL-pt did feel some pain with rotation Extension is definitely painful for the patient- 
Mild bilateral hip ER tightness-all other hip motions [facility] Strength:  
  R hip flex 5/5, quad 4+/5, HS 5/5, glut  Med 4+/5, ankles 5/5     L hip flex 4/5, quad 4/5, HS 4/5, glut med 4/5, ankles 5/5 
  
   
  
 Special Tests: negative SLR Neurological Screen: pt reporting numbness and tingling in bilateral toes Balance:  good Body Structures Involved: 1. Nerves 2. Bones 3. Joints 4. Muscles 5. Ligaments Body Functions Affected: 1. Sensory/Pain 2. Neuromusculoskeletal 
3. Movement Related Activities and Participation Affected: 1. General Tasks and Demands 2. Mobility 3. Domestic Life 4. Interpersonal Interactions and Relationships 5. Community, Social and [place] Number of elements (examined above) that affect the Plan of Care: 4+: HIGH COMPLEXITY CLINICAL PRESENTATION:  
Presentation: Evolving clinical presentation with changing clinical characteristics: MODERATE COMPLEXITY CLINICAL DECISION MAKING:  
Use of outcome tool(s) and clinical judgement create a POC that gives a: Questionable prediction of patient's progress: MODERATE COMPLEXITY

## 2020-08-18 NOTE — THERAPY DISCHARGE
Janett Cherry  : 1950  Payor: [address] [place] [address] / Plan: [address] / Product Type: Managed Care Medicare /  [facility] [place]  at [facility] Therapy  [address], [address]  [phone]   [phone]         OUTPATIENT PHYSICAL THERAPY:Discontinuation Summary 2020   ICD-10: Treatment Diagnosis: pain in right limb M79.604, pain in left limb M79.606, difficulty walking R 26.2  Precautions/Allergies:   Patient has no known allergies. TREATMENT PLAN:  Effective Dates: 2020 TO 2020 (90 days). Frequency/Duration: 2 times a week for 90 Day(s) and upon reassessment, will adjust frequency and duration as progress indicates. MEDICAL/REFERRING DIAGNOSIS:  Pain in left leg [M79.605]  Pain in left leg [M79.605]   DATE OF ONSET: 2020  REFERRING PHYSICIAN: Dani Marquez MD MD Orders: Yessenia Zapien and treat  Return MD Appointment: as needed     INITIAL ASSESSMENT:  Ms. Austyn Elam presents with increased bilateral leg pain with left worse than right today. She reported picking up a box and turning and she began to feel increased right hip/buttocks pain followed by right leg pain. She reported this has improved, but now she has increased pain in the left leg. She attempted yoga today and thinks this made her left leg worse. She did have some treatments by her chiropractor and he also took x-rays. Per his assessment, pt has stenosis. Pt has not seen chiropractor recently and was referred to therapy to address her pain. She was very active prior to her injury, exercising 5 days a week. She enjoys pilates, yoga, cardio and weight training. Discontinuation Assessment 2020:   Sherine Tinoco was  seen in physical therapy from 2020 to 3/16/2020 for 9 visits. Treatment has been discontinued at this time due to Covid shut down and pt not returning post reopening of clinics.   The below goals were met prior to discontinuation. Some goals were not met due to ongoing pain and spasms. Pt was reporting continued pain at last visit and spasms in hamstring with limitations grocery shopping, performing household tasks and inability to return to gym workouts. Thank you for this referral.        PROBLEM LIST (Impacting functional limitations):  1. Decreased Strength  2. Decreased ADL/Functional Activities  3. Decreased Ambulation Ability/Technique  4. Increased Pain  5. Decreased Activity Tolerance  6. Decreased Flexibility/Joint Mobility INTERVENTIONS PLANNED: (Treatment may consist of any combination of the following)  1. Electrical Stimulation  2. Heat  3. Home Exercise Program (HEP)  4. Manual Therapy  5. Range of Motion (ROM)  6. Therapeutic Exercise/Strengthening  7. lumbar mechanical traction     GOALS: (Goals have been discussed and agreed upon with patient.)  Short-Term Functional Goals: Time Frame: 4 weeks  1. Establish independent HEP with no cuing.-met  2. Pt will be able to report ambulating up to 20 minutes without increased pain. - in progress  3. Pt will be able to report ability to return to yoga for 20-30 minutes without increased pain. -not met  Discharge Goals: Time Frame: 12 weeks (90 days)- All goals listed below not met  1. Pt will be able to improve score on LEFS by at least 9 points for advanced ADL's.  2. Pt will be able to report ability to bend forward and lift objects without increased pain. 3. Pt will be able to increase left LE strength to 4+/5 to 5/5.  4. Pt will be able to return to prior activity level. OUTCOME MEASURE:   Tool Used: Lower Extremity Functional Scale (LEFS)  Score:  Initial: 35/80 Most Recent: X/80 (Date: -- )   Interpretation of Score: 20 questions each scored on a 5 point scale with 0 representing \"extreme difficulty or unable to perform\" and 4 representing \"no difficulty\". The lower the score, the greater the functional disability. 80/80 represents no disability.   Minimal detectable change is 9 points. Regarding [facility]'s therapy, I certify that the treatment plan above will be carried out by a therapist or under their direction.   Thank you for this referral,  Theodore Rubio, PT